# Patient Record
(demographics unavailable — no encounter records)

---

## 2024-12-10 NOTE — ASSESSMENT
[FreeTextEntry1] : EXAM Right knee with mild swelling, no erythema. +ttp along medial joint line and at pes insertion. Able to straight leg raise. Stable to varus/valgus stress. +TA, EHL, GA. SILT.  Right knee radiographs with no fracture nor dislocation; chondrocalcinosis. (3-view)  ASSESSMENT/PLAN Right pes anserine bursitis vs medial meniscus tear - reviewed radiographs and pathoanatomy with patient. Discussed management to consist of activity modification, PT, NSAIDs/Tylenol prn (as possible), injections.  Script for Meloxicam sent to pharmacy.  F/u 3-4mo

## 2024-12-10 NOTE — HISTORY OF PRESENT ILLNESS
[de-identified] : Age: 76 year F PMHx: HTN, HLD Hand Dominance: RHD Chief Complaint: Right knee pain s/p trauma 11/14/24. Patient reports that she was walking down the stairs when she tweaked her right knee, causing her immediate pain and discomfort. Patient reports constant pain on the medial aspect of her right knee, worse with weight bearing. Patient reports that she went to [facility] 11/15/24 where she had radiographs performed that were benign. Patient currently ambulating without any assistance. Patient reports that her pain has slightly improved since her initial injury, but still reports difficulty with stairs and prolonged periods of walking.  Trauma: 11/14/24 Outside Imaging/Treatment: radiographs from [facility] 11/15/24 OTC Medications: Extra strength Tylenol PRN with no relief OT/PT: none  Bracing: none Pain worse with: exertion, weight bearing Pain better with: rest, topical creams

## 2024-12-10 NOTE — HISTORY OF PRESENT ILLNESS
[de-identified] : Age: 76 year F PMHx: HTN, HLD Hand Dominance: RHD Chief Complaint: Right knee pain s/p trauma 11/14/24. Patient reports that she was walking down the stairs when she tweaked her right knee, causing her immediate pain and discomfort. Patient reports constant pain on the medial aspect of her right knee, worse with weight bearing. Patient reports that she went to [facility] 11/15/24 where she had radiographs performed that were benign. Patient currently ambulating without any assistance. Patient reports that her pain has slightly improved since her initial injury, but still reports difficulty with stairs and prolonged periods of walking.  Trauma: 11/14/24 Outside Imaging/Treatment: radiographs from [facility] 11/15/24 OTC Medications: Extra strength Tylenol PRN with no relief OT/PT: none  Bracing: none Pain worse with: exertion, weight bearing Pain better with: rest, topical creams